# Patient Record
(demographics unavailable — no encounter records)

---

## 2024-11-04 NOTE — PHYSICAL EXAM
[FreeTextEntry1] : Pleasant and cooperative with exam, appropriate for age. Ambulates without evidence of antalgia and limp, good coordination and balance. AAOX3  Skin: No rashes noted.  Eyes: Both conjunctiva, eyelids and pupils are present.  ENT:  Both ears, nose and lips are present. No nasal congestion.  Resp: No cough or wheezing noted.  Right ankle: No edema ecchymosis erythema.  Full active and passive range of motion with minimal discomfort elicited with palpation over the ATFL and deltoid ligament.  There is no discomfort over the medial or lateral malleolus.  There is no discomfort over the anterior aspect of the ankle.  There is no crepitus clicking locking or popping noted with range of motion.  No joint effusion.  There is no discomfort over the AITFL or CFL.  Minimal discomfort elicited with palpation of the calcaneus.  5\5 muscle strength.  Neurologically intact with full sensation to palpation. 2+ pulses palpated in the extremity. Capillary refill less than 2 seconds in all digits. DTRs are intact.

## 2024-11-04 NOTE — DATA REVIEWED
[de-identified] : Right ankle AP/lateral/oblique Xrays ordered, done and independently reviewed today:  No fractures noted.  No interval healing noted.  The mortise joint appears normal with no widening.  No talar tilt noted.  Growth plates are closed.

## 2024-11-04 NOTE — ASSESSMENT
[FreeTextEntry1] : Sharee is a 15-year-old girl who sustained a right ankle ATFL/deltoid sprain 2 weeks ago. Today's assessment was performed with the assistance of the patient's parent as an independent historian as the patient's history is unreliable. The radiographs obtained today were reviewed with both the parent and patient confirming normal ankle x rays.  The recommendation at this time would be to place her into an ankle lace up brace with no activities.  She may return to school.  She will also complete a course of physical therapy.  She will follow-up in 3 weeks for repeat examination.  We had a thorough talk in regard to the diagnosis, prognosis and treatment modalities.  All questions and concerns were addressed today. There was a verbal understanding from the parents and patient.  SHANTE Kenney have acted as a scribe and documented the above information for Dr. Nguyen.  This note was generated using Dragon medical dictation software. A reasonable effort has been made for proofreading its contents, however typos may still remain. If there are any questions or points of clarification needed please do not hesitate to contact my office.

## 2024-11-04 NOTE — REASON FOR VISIT
[Initial Evaluation] : an initial evaluation [Patient] : patient [Mother] : mother [FreeTextEntry1] : New right ankle injury sustained 2 weeks ago.

## 2024-11-04 NOTE — HISTORY OF PRESENT ILLNESS
[FreeTextEntry1] : Sharee is a 15-year-old girl who started to experience right ankle/heel discomfort for 2 weeks when she started dancing.  She denies any history of injury.  She was initially treated at a [facility] urgent care 1 week ago where x-rays were inconclusive of a fracture diagnosing her with a sprain.  She presents today feeling better however she continues to have residual discomfort.  She presents today for pediatric orthopedic consultation.

## 2024-11-25 NOTE — HISTORY OF PRESENT ILLNESS
[FreeTextEntry1] : Sharee is a 15-year-old girl who started to experience right ankle/heel discomfort approximately 4 weeks ago when she started dancing.  She denies any history of injury.  She was initially treated at a [facility] urgent care where x-rays were inconclusive of a fracture diagnosing her with a sprain. At her initial office visit on 11/4/24, we recommended use of a lace up ankle brace and activity restriction. Today, she reports she is overall doing better with significant improvement in her pain and discomfort. She self-discontinued the ankle brace and has been weight bearing as tolerated on the RLE without difficulty. She reports only mild residual discomfort about the right heel. She presents today for pediatric orthopedic consultation.

## 2024-11-25 NOTE — PHYSICAL EXAM
[FreeTextEntry1] : Pleasant and cooperative with exam, appropriate for age. Ambulates without evidence of antalgia and limp, good coordination and balance. AAOX3  Skin: No rashes noted.  Eyes: Both conjunctiva, eyelids and pupils are present.  ENT:  Both ears, nose and lips are present. No nasal congestion.  Resp: No cough or wheezing noted.  Right ankle: No edema ecchymosis erythema.  Full active and passive range of motion. There is no discomfort over the medial or lateral malleolus.  There is no discomfort over the anterior aspect of the ankle.  There is no crepitus clicking locking or popping noted with range of motion.  No joint effusion.  There is no discomfort over the AITFL or CFL.  Minimal discomfort elicited with palpation of the calcaneus.  5 5 muscle strength.  Neurologically intact with full sensation to palpation. 2+ pulses palpated in the extremity. Capillary refill less than 2 seconds in all digits. DTRs are intact.

## 2024-11-25 NOTE — ASSESSMENT
[FreeTextEntry1] : Sharee is a 15-year-old girl who sustained a right ankle ATFL/deltoid sprain 4 weeks ago.  Today's assessment was performed with the assistance of the patient's parent as an independent historian given the patient's age, who could not be considered a reliable history/due to the nonverbal nature given the patient's young age. Clinical findings were reviewed at length with the patient and parent. The condition, natural history, and prognosis were explained to the patient and family. Clinically, she is overall doing well with significant improvement in her ankle pain, with only minimal residual discomfort about the calcaneus with palpation. The recommendation at this time is to return back to all activities as tolerated without restrictions. School note provided at today's office visit. As for her pain about the calcaneus, we recommend icing of the heels and gel heel cups can also be obtained and utilized in her shoes for comfort to help relieve the pain. She may follow up in the office on an as needed basis for this issue at this time or return if any new concerns should arise.   All questions and concerns were addressed today. Parent and patient verbalize understanding and agree with plan of care.  ICarey PA-C, have acted as a scribe and documented the above information for Dr. Nguyen.

## 2024-11-25 NOTE — DATA REVIEWED
[de-identified] : No new imaging obtained at today's office visit.   Right ankle AP/lateral/oblique Xrays ordered, done and independently reviewed today11/4/24:  No fractures noted.  No interval healing noted.  The mortise joint appears normal with no widening.  No talar tilt noted.  Growth plates are closed.

## 2024-11-25 NOTE — REASON FOR VISIT
[Follow Up] : a follow up visit [Patient] : patient [Mother] : mother [FreeTextEntry1] : New right ankle injury sustained 4 weeks ago.